# Patient Record
Sex: MALE | Race: AMERICAN INDIAN OR ALASKA NATIVE | Employment: UNEMPLOYED | URBAN - METROPOLITAN AREA
[De-identification: names, ages, dates, MRNs, and addresses within clinical notes are randomized per-mention and may not be internally consistent; named-entity substitution may affect disease eponyms.]

---

## 2021-03-17 ENCOUNTER — HOSPITAL ENCOUNTER (INPATIENT)
Age: 71
LOS: 1 days | Discharge: HOME | DRG: 726 | End: 2021-03-18
Attending: EMERGENCY MEDICINE | Admitting: INTERNAL MEDICINE
Payer: MEDICARE

## 2021-03-17 ENCOUNTER — APPOINTMENT (OUTPATIENT)
Dept: CT IMAGING | Age: 71
DRG: 726 | End: 2021-03-17
Payer: MEDICARE

## 2021-03-17 ENCOUNTER — APPOINTMENT (OUTPATIENT)
Dept: GENERAL RADIOLOGY | Age: 71
DRG: 726 | End: 2021-03-17
Payer: MEDICARE

## 2021-03-17 DIAGNOSIS — R10.9 ABDOMINAL PAIN, UNSPECIFIED ABDOMINAL LOCATION: Primary | ICD-10-CM

## 2021-03-17 DIAGNOSIS — N17.9 AKI (ACUTE KIDNEY INJURY) (HCC): ICD-10-CM

## 2021-03-17 DIAGNOSIS — R33.9 URINARY RETENTION: ICD-10-CM

## 2021-03-17 LAB
ALBUMIN SERPL-MCNC: 3.4 GM/DL (ref 3.4–5)
ALP BLD-CCNC: 61 IU/L (ref 40–129)
ALT SERPL-CCNC: 19 U/L (ref 10–40)
ANION GAP SERPL CALCULATED.3IONS-SCNC: 13 MMOL/L (ref 4–16)
AST SERPL-CCNC: 18 IU/L (ref 15–37)
BACTERIA: NEGATIVE /HPF
BANDED NEUTROPHILS ABSOLUTE COUNT: 0.17 K/CU MM
BANDED NEUTROPHILS RELATIVE PERCENT: 1 % (ref 5–11)
BILIRUB SERPL-MCNC: 1 MG/DL (ref 0–1)
BILIRUBIN URINE: NEGATIVE MG/DL
BLOOD, URINE: ABNORMAL
BUN BLDV-MCNC: 43 MG/DL (ref 6–23)
CALCIUM SERPL-MCNC: 9.6 MG/DL (ref 8.3–10.6)
CHLORIDE BLD-SCNC: 97 MMOL/L (ref 99–110)
CLARITY: CLEAR
CO2: 22 MMOL/L (ref 21–32)
COLOR: YELLOW
CREAT SERPL-MCNC: 2 MG/DL (ref 0.9–1.3)
DIFFERENTIAL TYPE: ABNORMAL
GFR AFRICAN AMERICAN: 40 ML/MIN/1.73M2
GFR NON-AFRICAN AMERICAN: 33 ML/MIN/1.73M2
GLUCOSE BLD-MCNC: 106 MG/DL (ref 70–99)
GLUCOSE BLD-MCNC: 158 MG/DL (ref 70–99)
GLUCOSE, URINE: >500 MG/DL
HCT VFR BLD CALC: 50.9 % (ref 42–52)
HEMOGLOBIN: 16.7 GM/DL (ref 13.5–18)
KETONES, URINE: NEGATIVE MG/DL
LACTATE: 1.6 MMOL/L (ref 0.4–2)
LACTATE: 2.6 MMOL/L (ref 0.4–2)
LEUKOCYTE ESTERASE, URINE: NEGATIVE
LIPASE: 15 IU/L (ref 13–60)
LYMPHOCYTES ABSOLUTE: 1.3 K/CU MM
LYMPHOCYTES RELATIVE PERCENT: 8 % (ref 24–44)
MCH RBC QN AUTO: 28.5 PG (ref 27–31)
MCHC RBC AUTO-ENTMCNC: 32.8 % (ref 32–36)
MCV RBC AUTO: 87 FL (ref 78–100)
MONOCYTES ABSOLUTE: 0.8 K/CU MM
MONOCYTES RELATIVE PERCENT: 5 % (ref 0–4)
MUCUS: ABNORMAL HPF
NITRITE URINE, QUANTITATIVE: NEGATIVE
PDW BLD-RTO: 13.6 % (ref 11.7–14.9)
PH, URINE: 5 (ref 5–8)
PLATELET # BLD: 196 K/CU MM (ref 140–440)
PMV BLD AUTO: 8.9 FL (ref 7.5–11.1)
POTASSIUM SERPL-SCNC: 4.7 MMOL/L (ref 3.5–5.1)
PRO-BNP: 1509 PG/ML
PROTEIN UA: NEGATIVE MG/DL
RBC # BLD: 5.85 M/CU MM (ref 4.6–6.2)
RBC URINE: 8 /HPF (ref 0–3)
SEGMENTED NEUTROPHILS ABSOLUTE COUNT: 14.3 K/CU MM
SEGMENTED NEUTROPHILS RELATIVE PERCENT: 86 % (ref 36–66)
SODIUM BLD-SCNC: 132 MMOL/L (ref 135–145)
SPECIFIC GRAVITY UA: 1.01 (ref 1–1.03)
TOTAL PROTEIN: 7 GM/DL (ref 6.4–8.2)
TRICHOMONAS: ABNORMAL /HPF
TROPONIN T: 0.06 NG/ML
TROPONIN T: 0.06 NG/ML
UROBILINOGEN, URINE: NEGATIVE MG/DL (ref 0.2–1)
WBC # BLD: 16.6 K/CU MM (ref 4–10.5)
WBC UA: 1 /HPF (ref 0–2)

## 2021-03-17 PROCEDURE — 2500000003 HC RX 250 WO HCPCS: Performed by: PHYSICIAN ASSISTANT

## 2021-03-17 PROCEDURE — 94761 N-INVAS EAR/PLS OXIMETRY MLT: CPT

## 2021-03-17 PROCEDURE — 2580000003 HC RX 258: Performed by: PHYSICIAN ASSISTANT

## 2021-03-17 PROCEDURE — 6360000004 HC RX CONTRAST MEDICATION: Performed by: PHYSICIAN ASSISTANT

## 2021-03-17 PROCEDURE — 99285 EMERGENCY DEPT VISIT HI MDM: CPT

## 2021-03-17 PROCEDURE — 36415 COLL VENOUS BLD VENIPUNCTURE: CPT

## 2021-03-17 PROCEDURE — 83880 ASSAY OF NATRIURETIC PEPTIDE: CPT

## 2021-03-17 PROCEDURE — 85007 BL SMEAR W/DIFF WBC COUNT: CPT

## 2021-03-17 PROCEDURE — 96361 HYDRATE IV INFUSION ADD-ON: CPT

## 2021-03-17 PROCEDURE — 87040 BLOOD CULTURE FOR BACTERIA: CPT

## 2021-03-17 PROCEDURE — 85027 COMPLETE CBC AUTOMATED: CPT

## 2021-03-17 PROCEDURE — 83605 ASSAY OF LACTIC ACID: CPT

## 2021-03-17 PROCEDURE — 82962 GLUCOSE BLOOD TEST: CPT

## 2021-03-17 PROCEDURE — 74174 CTA ABD&PLVS W/CONTRAST: CPT

## 2021-03-17 PROCEDURE — 80053 COMPREHEN METABOLIC PANEL: CPT

## 2021-03-17 PROCEDURE — 51702 INSERT TEMP BLADDER CATH: CPT

## 2021-03-17 PROCEDURE — 1200000000 HC SEMI PRIVATE

## 2021-03-17 PROCEDURE — 93005 ELECTROCARDIOGRAM TRACING: CPT | Performed by: PHYSICIAN ASSISTANT

## 2021-03-17 PROCEDURE — 81001 URINALYSIS AUTO W/SCOPE: CPT

## 2021-03-17 PROCEDURE — 2580000003 HC RX 258: Performed by: INTERNAL MEDICINE

## 2021-03-17 PROCEDURE — 84484 ASSAY OF TROPONIN QUANT: CPT

## 2021-03-17 PROCEDURE — 96365 THER/PROPH/DIAG IV INF INIT: CPT

## 2021-03-17 PROCEDURE — 83690 ASSAY OF LIPASE: CPT

## 2021-03-17 PROCEDURE — 6360000002 HC RX W HCPCS: Performed by: PHYSICIAN ASSISTANT

## 2021-03-17 RX ORDER — SODIUM CHLORIDE 0.9 % (FLUSH) 0.9 %
10 SYRINGE (ML) INJECTION 2 TIMES DAILY
Status: DISCONTINUED | OUTPATIENT
Start: 2021-03-17 | End: 2021-03-17

## 2021-03-17 RX ORDER — POLYETHYLENE GLYCOL 3350 17 G/17G
17 POWDER, FOR SOLUTION ORAL DAILY PRN
Status: DISCONTINUED | OUTPATIENT
Start: 2021-03-17 | End: 2021-03-18 | Stop reason: HOSPADM

## 2021-03-17 RX ORDER — TAMSULOSIN HYDROCHLORIDE 0.4 MG/1
0.4 CAPSULE ORAL DAILY
Status: DISCONTINUED | OUTPATIENT
Start: 2021-03-18 | End: 2021-03-18 | Stop reason: HOSPADM

## 2021-03-17 RX ORDER — HEPARIN SODIUM 5000 [USP'U]/ML
5000 INJECTION, SOLUTION INTRAVENOUS; SUBCUTANEOUS EVERY 8 HOURS SCHEDULED
Status: DISCONTINUED | OUTPATIENT
Start: 2021-03-18 | End: 2021-03-18 | Stop reason: HOSPADM

## 2021-03-17 RX ORDER — SODIUM CHLORIDE 0.9 % (FLUSH) 0.9 %
10 SYRINGE (ML) INJECTION EVERY 12 HOURS SCHEDULED
Status: DISCONTINUED | OUTPATIENT
Start: 2021-03-17 | End: 2021-03-18 | Stop reason: HOSPADM

## 2021-03-17 RX ORDER — DEXTROSE MONOHYDRATE 50 MG/ML
100 INJECTION, SOLUTION INTRAVENOUS PRN
Status: DISCONTINUED | OUTPATIENT
Start: 2021-03-17 | End: 2021-03-18 | Stop reason: HOSPADM

## 2021-03-17 RX ORDER — NICOTINE POLACRILEX 4 MG
15 LOZENGE BUCCAL PRN
Status: DISCONTINUED | OUTPATIENT
Start: 2021-03-17 | End: 2021-03-18 | Stop reason: HOSPADM

## 2021-03-17 RX ORDER — DEXTROSE MONOHYDRATE 25 G/50ML
12.5 INJECTION, SOLUTION INTRAVENOUS PRN
Status: DISCONTINUED | OUTPATIENT
Start: 2021-03-17 | End: 2021-03-18 | Stop reason: HOSPADM

## 2021-03-17 RX ORDER — ACETAMINOPHEN 325 MG/1
650 TABLET ORAL EVERY 6 HOURS PRN
Status: DISCONTINUED | OUTPATIENT
Start: 2021-03-17 | End: 2021-03-18 | Stop reason: HOSPADM

## 2021-03-17 RX ORDER — ONDANSETRON 2 MG/ML
4 INJECTION INTRAMUSCULAR; INTRAVENOUS EVERY 6 HOURS PRN
Status: DISCONTINUED | OUTPATIENT
Start: 2021-03-17 | End: 2021-03-18 | Stop reason: HOSPADM

## 2021-03-17 RX ORDER — SODIUM CHLORIDE 0.9 % (FLUSH) 0.9 %
10 SYRINGE (ML) INJECTION PRN
Status: DISCONTINUED | OUTPATIENT
Start: 2021-03-17 | End: 2021-03-18 | Stop reason: HOSPADM

## 2021-03-17 RX ORDER — 0.9 % SODIUM CHLORIDE 0.9 %
1000 INTRAVENOUS SOLUTION INTRAVENOUS ONCE
Status: COMPLETED | OUTPATIENT
Start: 2021-03-17 | End: 2021-03-17

## 2021-03-17 RX ORDER — PROMETHAZINE HYDROCHLORIDE 12.5 MG/1
12.5 TABLET ORAL EVERY 6 HOURS PRN
Status: DISCONTINUED | OUTPATIENT
Start: 2021-03-17 | End: 2021-03-18 | Stop reason: HOSPADM

## 2021-03-17 RX ORDER — SODIUM CHLORIDE 9 MG/ML
INJECTION, SOLUTION INTRAVENOUS CONTINUOUS
Status: DISCONTINUED | OUTPATIENT
Start: 2021-03-17 | End: 2021-03-18 | Stop reason: HOSPADM

## 2021-03-17 RX ORDER — ACETAMINOPHEN 650 MG/1
650 SUPPOSITORY RECTAL EVERY 6 HOURS PRN
Status: DISCONTINUED | OUTPATIENT
Start: 2021-03-17 | End: 2021-03-18 | Stop reason: HOSPADM

## 2021-03-17 RX ORDER — SODIUM CHLORIDE 9 MG/ML
INJECTION, SOLUTION INTRAVENOUS CONTINUOUS
Status: DISCONTINUED | OUTPATIENT
Start: 2021-03-17 | End: 2021-03-17

## 2021-03-17 RX ADMIN — SODIUM CHLORIDE: 9 INJECTION, SOLUTION INTRAVENOUS at 17:40

## 2021-03-17 RX ADMIN — CEFTRIAXONE 1000 MG: 1 INJECTION, POWDER, FOR SOLUTION INTRAMUSCULAR; INTRAVENOUS at 20:37

## 2021-03-17 RX ADMIN — METRONIDAZOLE 500 MG: 500 INJECTION, SOLUTION INTRAVENOUS at 22:16

## 2021-03-17 RX ADMIN — SODIUM CHLORIDE 1000 ML: 9 INJECTION, SOLUTION INTRAVENOUS at 17:40

## 2021-03-17 RX ADMIN — IOPAMIDOL 100 ML: 755 INJECTION, SOLUTION INTRAVENOUS at 19:19

## 2021-03-17 RX ADMIN — SODIUM CHLORIDE: 9 INJECTION, SOLUTION INTRAVENOUS at 23:10

## 2021-03-17 SDOH — HEALTH STABILITY: MENTAL HEALTH: HOW OFTEN DO YOU HAVE A DRINK CONTAINING ALCOHOL?: NEVER

## 2021-03-17 ASSESSMENT — PAIN SCALES - GENERAL
PAINLEVEL_OUTOF10: 0
PAINLEVEL_OUTOF10: 0
PAINLEVEL_OUTOF10: 6

## 2021-03-17 ASSESSMENT — PAIN DESCRIPTION - LOCATION: LOCATION: ABDOMEN

## 2021-03-17 NOTE — ED PROVIDER NOTES
Non-medical: Not on file   Tobacco Use    Smoking status: Never Smoker    Smokeless tobacco: Never Used   Substance and Sexual Activity    Alcohol use: Never     Frequency: Never    Drug use: Not on file    Sexual activity: Not on file   Lifestyle    Physical activity     Days per week: Not on file     Minutes per session: Not on file    Stress: Not on file   Relationships    Social connections     Talks on phone: Not on file     Gets together: Not on file     Attends [employer] service: Not on file     Active member of club or organization: Not on file     Attends meetings of clubs or organizations: Not on file     Relationship status: Not on file    Intimate partner violence     Fear of current or ex partner: Not on file     Emotionally abused: Not on file     Physically abused: Not on file     Forced sexual activity: Not on file   Other Topics Concern    Not on file   Social History Narrative    Not on file     No family history on file. PHYSICAL EXAM    VITAL SIGNS: BP (!) 148/81   Pulse 101   Temp 99.2 °F (37.3 °C) (Oral)   Resp 18   Ht 5' 6\" (1.676 m)   Wt 154 lb 5.2 oz (70 kg)   SpO2 95%   BMI 24.91 kg/m²    Constitutional:  Well developed, Well nourished. No distress  HENT:  Normocephalic, Atraumatic, PERRL. EOMI. Sclera clear. Conjunctiva normal, No discharge. Neck/Lymphatics: supple, no JVD, no swollen nodes  Cardiovascular: Rate 115, regular no murmurs/rubs/gallops. No JVD  No carotid bruits or murmurs heard in carotids. Respiratory:  Nonlabored breathing. Normal breath sounds, No wheezing  Abdomen: Hypoactive bowel sounds. Patient's abdomen is distended. There is moderate abdominal tenderness and guarding in all 4 quadrants with rebound, most notable in the lower quadrants. There is tympany to percussion in the upper abdominal quadrants. Musculoskeletal:    There is no edema, asymmetry, or calf / thigh tenderness bilaterally. No cyanosis.     No cool or pale-appearing limb. Distal cap refill and pulses intact bilateral upper and lower extremities  Bilateral upper and lower extremity ROM intact without pain or obvious deficit  Integument:  Warm, Dry  Neurologic: Alert & oriented , No focal deficits noted. Cranial nerves II through XII grossly intact. Normal gross motor coordination & motor strength bilateral upper and lower extremities  Sensation intact.   Psychiatric:  Affect normal, Mood normal.       Labs:  Results for orders placed or performed during the hospital encounter of 03/17/21   CBC Auto Differential   Result Value Ref Range    WBC 16.6 (H) 4.0 - 10.5 K/CU MM    RBC 5.85 4.6 - 6.2 M/CU MM    Hemoglobin 16.7 13.5 - 18.0 GM/DL    Hematocrit 50.9 42 - 52 %    MCV 87.0 78 - 100 FL    MCH 28.5 27 - 31 PG    MCHC 32.8 32.0 - 36.0 %    RDW 13.6 11.7 - 14.9 %    Platelets 902 924 - 271 K/CU MM    MPV 8.9 7.5 - 11.1 FL    Bands Relative 1 (L) 5 - 11 %    Segs Relative 86.0 (H) 36 - 66 %    Lymphocytes % 8.0 (L) 24 - 44 %    Monocytes % 5.0 (H) 0 - 4 %    Bands Absolute 0.17 K/CU MM    Segs Absolute 14.3 K/CU MM    Lymphocytes Absolute 1.3 K/CU MM    Monocytes Absolute 0.8 K/CU MM    Differential Type MANUAL DIFFERENTIAL    Comprehensive Metabolic Panel   Result Value Ref Range    Sodium 132 (L) 135 - 145 MMOL/L    Potassium 4.7 3.5 - 5.1 MMOL/L    Chloride 97 (L) 99 - 110 mMol/L    CO2 22 21 - 32 MMOL/L    BUN 43 (H) 6 - 23 MG/DL    CREATININE 2.0 (H) 0.9 - 1.3 MG/DL    Glucose 158 (H) 70 - 99 MG/DL    Calcium 9.6 8.3 - 10.6 MG/DL    Albumin 3.4 3.4 - 5.0 GM/DL    Total Protein 7.0 6.4 - 8.2 GM/DL    Total Bilirubin 1.0 0.0 - 1.0 MG/DL    ALT 19 10 - 40 U/L    AST 18 15 - 37 IU/L    Alkaline Phosphatase 61 40 - 129 IU/L    GFR Non- 33 (L) >60 mL/min/1.73m2    GFR  40 (L) >60 mL/min/1.73m2    Anion Gap 13 4 - 16   Urinalysis   Result Value Ref Range    Color, UA YELLOW YELLOW    Clarity, UA CLEAR CLEAR    Glucose, Urine >500 (A) NEGATIVE MG/DL    Bilirubin Urine NEGATIVE NEGATIVE MG/DL    Ketones, Urine NEGATIVE NEGATIVE MG/DL    Specific Gravity, UA 1.011 1.001 - 1.035    Blood, Urine LARGE (A) NEGATIVE    pH, Urine 5.0 5.0 - 8.0    Protein, UA NEGATIVE NEGATIVE MG/DL    Urobilinogen, Urine NEGATIVE 0.2 - 1.0 MG/DL    Nitrite Urine, Quantitative NEGATIVE NEGATIVE    Leukocyte Esterase, Urine NEGATIVE NEGATIVE    RBC, UA 8 (H) 0 - 3 /HPF    WBC, UA 1 0 - 2 /HPF    Bacteria, UA NEGATIVE NEGATIVE /HPF    Mucus, UA RARE (A) NEGATIVE HPF    Trichomonas, UA NONE SEEN NONE SEEN /HPF   Lactic Acid, Plasma   Result Value Ref Range    Lactate 2.6 (HH) 0.4 - 2.0 mMOL/L   Troponin   Result Value Ref Range    Troponin T 0.064 (H) <0.01 NG/ML   Lactic Acid, Plasma   Result Value Ref Range    Lactate 1.6 0.4 - 2.0 mMOL/L   EKG 12 Lead   Result Value Ref Range    Ventricular Rate 117 BPM    Atrial Rate 117 BPM    P-R Interval 142 ms    QRS Duration 74 ms    Q-T Interval 324 ms    QTc Calculation (Bazett) 451 ms    P Axis 42 degrees    R Axis -42 degrees    T Axis 33 degrees    Diagnosis       Sinus tachycardia  Left axis deviation  Abnormal ECG  No previous ECGs available             EKG Interpretation  Please see ED physician's note for EKG interpretation       RADIOLOGY    CTA ABDOMEN PELVIS W CONTRAST   Final Result   Nonocclusive plaque in the proximal aspect of the superior mesenteric artery   leads to moderate to high-grade stenosis. Correlate with any clinical   evidence of chronic mesenteric ischemia. Bilateral perinephric fat stranding and periureteral fat stranding. Correlate with clinical evidence of urinary tract infection. Moderate to marked urinary bladder distention, likely on the basis of bladder   outlet obstruction. ED COURSE & MEDICAL DECISION MAKING       Patient presents as above with abdominal discomfort. Bingham catheter placed in 1200 mL of urine was evacuated. Patient's pain improved.   CTA abdomen/pelvis was done today and reveals evidence of mesenteric artery stenosis without occlusion. I reviewed these findings with on-call thoracic surgeon, Dr. Josee Hernandez. She advises that this is not acute or emergent findings and that no emergent intervention warranted regarding abdominal vasculature. Patient does have evidence of NOE, likely from urinary retention. He does have elevated troponin which I suspect is from NOE. No acute EKG changes. 2110-I discussed patient case with hospitalist, Dr. Dafne Perez. She agrees to admit patient. Clinical  IMPRESSION    1. Abdominal pain, unspecified abdominal location    2. Urinary retention    3. NOE (acute kidney injury) ([facility] [address]        Pt admitted. Comment: Please note this report has been produced using speech recognition software and may contain errors related to that system including errors in grammar, punctuation, and spelling, as well as words and phrases that may be inappropriate. If there are any questions or concerns please feel free to contact the dictating provider for clarification.          Flavia Adams  03/17/21 2114

## 2021-03-17 NOTE — ED NOTES
Bed: ED-27  Expected date:   Expected time:   Means of arrival:   Comments:  Cleaning now     Chase Villeda RN  03/17/21 5639

## 2021-03-17 NOTE — ED PROVIDER NOTES
I independently examined and evaluated Chester Mistry. In brief their history revealed a 69 y.o. male who presents with constipation, generalized abdominal discomfort. Onset 3-4 days. Context is patient states has not had a bowel movement for 3 days notes generalized abdominal discomfort with one episode of nonbloody vomiting 2 days ago. Patient's daughter who is present today says patient has not eaten for the past 24 hours. Patient has had some dysuria and difficulty urinating. Denies history of prostate issues were abdominal surgeries no other questions or concerns    Their focused exam revealed alert and oriented male resting in bed no distress or cephalic atraumatic sclera clear lungs clear heart tachycardic 2+ pulses throughout regular rhythm abdomen is soft distended mildly tender diffusely Bingham catheter in place with drainage, urine no hernia no pulsatile mass bowel sounds present normal.  5 of 5 strength throughout skin has no other rashes swelling cranial nerves intact. ED course: Patient seen with PA please see his note. Patient here with abdominal pain, distention, nausea vomiting, difficulty urinating. Denies any medical problems or history of abdominal surgeries or history of prostate issues. States he has been having difficulty urinating, dysuria. On arrival he was tachycardic distended guarding I did get involved in his case. Patient went to CT scan which shows possible mesenteric ischemia will consult cardiothoracic surgery patient did have distended bladder feels better after Bingham catheter went urinalysis. Will give antibiotics IV fluids pain nausea medicine. He does have elevated white count. Could be stress reaction versus infection. Patient otherwise stable admit. All diagnostic, treatment, and disposition decisions were made by myself in conjunction with the Advanced Practice Provider.     For all further details of the patient's emergency department visit, please see the Advanced Practice Provider's documentation. 12 lead EKG per my interpretation:  Sinus Tachycardia 117  Axis is   Left axis deviation  QTc is  451  There is no specific T wave changes appreciated. There is no specific ST wave changes appreciated.     Prior EKG to compare with was not available         Norma Domingo, DO  03/17/21 5400 Abe Alonso,   03/17/21 2024

## 2021-03-18 VITALS
WEIGHT: 154.32 LBS | TEMPERATURE: 97.9 F | RESPIRATION RATE: 17 BRPM | BODY MASS INDEX: 24.8 KG/M2 | OXYGEN SATURATION: 94 % | SYSTOLIC BLOOD PRESSURE: 125 MMHG | DIASTOLIC BLOOD PRESSURE: 73 MMHG | HEART RATE: 78 BPM | HEIGHT: 66 IN

## 2021-03-18 LAB
ANION GAP SERPL CALCULATED.3IONS-SCNC: 9 MMOL/L (ref 4–16)
BASOPHILS ABSOLUTE: 0 K/CU MM
BASOPHILS RELATIVE PERCENT: 0.4 % (ref 0–1)
BUN BLDV-MCNC: 27 MG/DL (ref 6–23)
CALCIUM SERPL-MCNC: 8.6 MG/DL (ref 8.3–10.6)
CHLORIDE BLD-SCNC: 105 MMOL/L (ref 99–110)
CO2: 25 MMOL/L (ref 21–32)
CREAT SERPL-MCNC: 1.2 MG/DL (ref 0.9–1.3)
DIFFERENTIAL TYPE: ABNORMAL
EOSINOPHILS ABSOLUTE: 0.3 K/CU MM
EOSINOPHILS RELATIVE PERCENT: 2.9 % (ref 0–3)
GFR AFRICAN AMERICAN: >60 ML/MIN/1.73M2
GFR NON-AFRICAN AMERICAN: 60 ML/MIN/1.73M2
GLUCOSE BLD-MCNC: 106 MG/DL (ref 70–99)
GLUCOSE BLD-MCNC: 99 MG/DL (ref 70–99)
HCT VFR BLD CALC: 43 % (ref 42–52)
HEMOGLOBIN: 14.4 GM/DL (ref 13.5–18)
IMMATURE NEUTROPHIL %: 0.5 % (ref 0–0.43)
LYMPHOCYTES ABSOLUTE: 1.3 K/CU MM
LYMPHOCYTES RELATIVE PERCENT: 12.8 % (ref 24–44)
MAGNESIUM: 2.1 MG/DL (ref 1.8–2.4)
MCH RBC QN AUTO: 28.7 PG (ref 27–31)
MCHC RBC AUTO-ENTMCNC: 33.5 % (ref 32–36)
MCV RBC AUTO: 85.7 FL (ref 78–100)
MONOCYTES ABSOLUTE: 1.4 K/CU MM
MONOCYTES RELATIVE PERCENT: 13.9 % (ref 0–4)
NUCLEATED RBC %: 0 %
PDW BLD-RTO: 13.6 % (ref 11.7–14.9)
PLATELET # BLD: 169 K/CU MM (ref 140–440)
PMV BLD AUTO: 9.1 FL (ref 7.5–11.1)
POTASSIUM SERPL-SCNC: 3.5 MMOL/L (ref 3.5–5.1)
RBC # BLD: 5.02 M/CU MM (ref 4.6–6.2)
SEGMENTED NEUTROPHILS ABSOLUTE COUNT: 7 K/CU MM
SEGMENTED NEUTROPHILS RELATIVE PERCENT: 69.5 % (ref 36–66)
SODIUM BLD-SCNC: 139 MMOL/L (ref 135–145)
TOTAL IMMATURE NEUTOROPHIL: 0.05 K/CU MM
TOTAL NUCLEATED RBC: 0 K/CU MM
TROPONIN T: 0.05 NG/ML
WBC # BLD: 10.1 K/CU MM (ref 4–10.5)

## 2021-03-18 PROCEDURE — 83735 ASSAY OF MAGNESIUM: CPT

## 2021-03-18 PROCEDURE — 84484 ASSAY OF TROPONIN QUANT: CPT

## 2021-03-18 PROCEDURE — 93010 ELECTROCARDIOGRAM REPORT: CPT | Performed by: INTERNAL MEDICINE

## 2021-03-18 PROCEDURE — 2580000003 HC RX 258: Performed by: INTERNAL MEDICINE

## 2021-03-18 PROCEDURE — 6360000002 HC RX W HCPCS: Performed by: INTERNAL MEDICINE

## 2021-03-18 PROCEDURE — 6370000000 HC RX 637 (ALT 250 FOR IP): Performed by: INTERNAL MEDICINE

## 2021-03-18 PROCEDURE — 85025 COMPLETE CBC W/AUTO DIFF WBC: CPT

## 2021-03-18 PROCEDURE — 6370000000 HC RX 637 (ALT 250 FOR IP): Performed by: SPECIALIST

## 2021-03-18 PROCEDURE — 80048 BASIC METABOLIC PNL TOTAL CA: CPT

## 2021-03-18 PROCEDURE — 94761 N-INVAS EAR/PLS OXIMETRY MLT: CPT

## 2021-03-18 PROCEDURE — C9113 INJ PANTOPRAZOLE SODIUM, VIA: HCPCS | Performed by: INTERNAL MEDICINE

## 2021-03-18 PROCEDURE — 36415 COLL VENOUS BLD VENIPUNCTURE: CPT

## 2021-03-18 PROCEDURE — 82962 GLUCOSE BLOOD TEST: CPT

## 2021-03-18 RX ORDER — ASPIRIN 81 MG/1
81 TABLET ORAL DAILY
Qty: 90 TABLET | Refills: 1 | Status: SHIPPED | OUTPATIENT
Start: 2021-03-18

## 2021-03-18 RX ORDER — TAMSULOSIN HYDROCHLORIDE 0.4 MG/1
0.4 CAPSULE ORAL DAILY
Qty: 30 CAPSULE | Refills: 3 | Status: SHIPPED | OUTPATIENT
Start: 2021-03-19

## 2021-03-18 RX ORDER — POLYETHYLENE GLYCOL 3350 17 G/17G
17 POWDER, FOR SOLUTION ORAL DAILY
Status: DISCONTINUED | OUTPATIENT
Start: 2021-03-18 | End: 2021-03-18 | Stop reason: HOSPADM

## 2021-03-18 RX ORDER — PANTOPRAZOLE SODIUM 40 MG/1
40 TABLET, DELAYED RELEASE ORAL
Qty: 30 TABLET | Refills: 3 | Status: SHIPPED | OUTPATIENT
Start: 2021-03-18

## 2021-03-18 RX ORDER — POLYETHYLENE GLYCOL 3350 17 G/17G
17 POWDER, FOR SOLUTION ORAL DAILY PRN
Qty: 30 EACH | Refills: 0 | Status: SHIPPED | OUTPATIENT
Start: 2021-03-18 | End: 2021-04-17

## 2021-03-18 RX ORDER — PANTOPRAZOLE SODIUM 40 MG/10ML
40 INJECTION, POWDER, LYOPHILIZED, FOR SOLUTION INTRAVENOUS DAILY
Status: DISCONTINUED | OUTPATIENT
Start: 2021-03-18 | End: 2021-03-18 | Stop reason: HOSPADM

## 2021-03-18 RX ADMIN — HEPARIN SODIUM 5000 UNITS: 5000 INJECTION, SOLUTION INTRAVENOUS; SUBCUTANEOUS at 06:10

## 2021-03-18 RX ADMIN — POLYETHYLENE GLYCOL (3350) 17 G: 17 POWDER, FOR SOLUTION ORAL at 09:40

## 2021-03-18 RX ADMIN — METOPROLOL TARTRATE 25 MG: 25 TABLET, FILM COATED ORAL at 09:40

## 2021-03-18 RX ADMIN — SODIUM CHLORIDE, PRESERVATIVE FREE 10 ML: 5 INJECTION INTRAVENOUS at 08:54

## 2021-03-18 RX ADMIN — PANTOPRAZOLE SODIUM 40 MG: 40 INJECTION, POWDER, FOR SOLUTION INTRAVENOUS at 08:54

## 2021-03-18 RX ADMIN — TAMSULOSIN HYDROCHLORIDE 0.4 MG: 0.4 CAPSULE ORAL at 09:40

## 2021-03-18 RX ADMIN — SODIUM CHLORIDE: 9 INJECTION, SOLUTION INTRAVENOUS at 09:40

## 2021-03-18 ASSESSMENT — PAIN SCALES - GENERAL: PAINLEVEL_OUTOF10: 0

## 2021-03-18 NOTE — CONSULTS
[address], [address] [facility]                                  CONSULTATION    PATIENT NAME: Yulissa Kaplan                   :        1950  MED REC NO:   [id number]                          ROOM:       [id number]  ACCOUNT NO:   [de-identified]                           ADMIT DATE: 2021  PROVIDER:     John De La Cruz MD    CONSULT DATE:  2021    CHIEF COMPLAINT:  Abdominal pain with constipation and urinary  retention. HISTORY OF PRESENT ILLNESS:  The patient is a 65-year-old [place] [facility] male  with past medical history significant for diabetes mellitus who  presented to the emergency room on 2021 with constipation for the  past three days, generalized abdominal discomfort and urinary retention  with dribbling of urine and the patient also gives history of  intermittent hematuria. The patient had vomiting x1, but there is no  history of melena, hematochezia, anorexia or weight loss. In the ER,  the patient had a blood workup done, which comprised of chem profile,  which was remarkable for BUN of 43, creatinine 2 and LFTs were within  normal limits. CBC showed a WBC count of 16.6, hemoglobin 16.7,  platelet count of 536,519. CAT scan of the abdomen and pelvis was done,  which showed nonobstructive plaque in the proximal aspect of the SMA,  also bilateral perinephric fat stranding was noted along with  periureteral fat stranding and moderate-to-marked urinary distention due  to bladder outlet obstruction as well. The patient was admitted for  further workup and management. The patient has never had an EGD or  colonoscopy done and patient denies problem with his bowels in the past  as well. REVIEW OF SYSTEMS:  CENTRAL NERVOUS SYSTEM:  The patient denies headache or focal  sensorimotor symptoms. CARDIOVASCULAR SYSTEM:  No history of chest pain, shortness of breath,  or leg swelling.   GENITOURINARY SYSTEM:  The patient complains of urinary retention with  difficulty in micturition and dribbling of the urine for the past three  to four days with progressive abdominal distention. MUSCULOSKELETAL SYSTEM:  The patient complains of generalized weakness. RESPIRATORY SYSTEM:  No history of cough, hemoptysis, fever, or chills. PAST MEDICAL HISTORY:  Significant for history of diabetes mellitus. FAMILY HISTORY:  Noncontributory. MEDICATIONS:  Please refer to chart. SOCIOECONOMIC HISTORY:  No history of EtOH abuse and the patient does  not smoke cigarettes. PAST SURGICAL HISTORY:  None. ALLERGIES:  No known drug allergies. PHYSICAL EXAMINATION:  GENERAL:  Shows a 67-year-old [place] ([facility]) male of average build and  nutritional status, who is lying comfortably flat in bed, in no acute  distress. He is awake, alert, and oriented and pleasant to talk with. VITAL SIGNS:  Stable. HEENT:  Shows skull to be atraumatic. NECK:  Supple. CHEST:  Clear. HEART:  S1 and S2 are normal.  ABDOMEN:  Soft and distended with mild generalized tenderness with no  guarding or rigidity. Liver and spleen are not palpable. RECTAL:  Deferred. CNS:  Shows the patient to be awake, alert, and oriented. There are no  focal sensorimotor signs. MUSCULOSKELETAL SYSTEM:  Shows evidence of degenerative joint disease  changes. LABORATORY DATA:  As above mentioned. IMPRESSION:  A 67-year-old [place] ([facility]) male in apparently good health apart  from history of diabetes mellitus, presents with generalized abdominal  discomfort, distention, constipation and urinary retention, and etiology  to be determined. RECOMMENDATIONS:  1. Agree with present management. 2.  The patient will need a Urology evaluation for urinary retention. 3.  We will start the patient on MiraLAX 17 gm p.o. at bedtime. 4.  In fact once the patient's urinary retention is resolved, the  patient will need a colonoscopy also as a part of workup of his  constipation.   5. Cardiothoracic Surgery consult in order also for nonocclusive plaque  noted in the SMA. 6.  The case and plan have been discussed in detail with the patient and  his daughter-in-law.         Nuris Edmond MD    D: 03/18/2021 8:37:33       T: 03/18/2021 12:16:37     [email or web address]  Job#: [id number]     Doc#: [id number]    CC:

## 2021-03-18 NOTE — ED NOTES
CTA ABDOMEN PELVIS W CONTRAST  Status: Final result   Order Providers    Authorizing Billing   Flavia Grey MD          Signed by    Signed Date/Time Phone Pager   Asim Reed 3/17/2021  7:59 [phone]    Reading Providers    Read Date Phone Pager   Jelani Fisher Mar 17, 2021  7:59 [phone]    Radiation Dose Estimates    No radiation information found for this patient   Narrative   EXAMINATION:   CTA OF THE ABDOMEN AND PELVIS WITH CONTRAST 3/17/2021 4:04 pm       TECHNIQUE:   CTA of the abdomen and pelvis was performed with the administration of   intravenous contrast. Multiplanar reformatted images are provided for review. MIP images are provided for review. Dose modulation, iterative   reconstruction, and/or weight based adjustment of the mA/kV was utilized to   reduce the radiation dose to as low as reasonably achievable.       COMPARISON:   None.       HISTORY:   ORDERING SYSTEM PROVIDED HISTORY: abd pain, constipation, vomiting   TECHNOLOGIST PROVIDED HISTORY:   Reason for exam:->abd pain, constipation, vomiting   Decision Support Exception->Emergency Medical Condition (MA)   Reason for Exam: midline upper abd pain       FINDINGS:   VASCULAR STRUCTURES:       The abdominal aorta is normal in caliber without evidence of dissection.       The celiac artery and its branches are widely patent.  There is moderate to   high-grade stenosis of the proximal superior mesenteric artery secondary to   fibro atheromatous plaque.  The degree of narrowing is approximately 60-70%.    The distal aspect of the SMA does enhance.  The inferior mesenteric artery is   diminutive but patent.       The renal arteries are patent bilaterally.  The iliac systems are widely   patent bilaterally.       NONVASCULAR STRUCTURES:       Lower Chest: There is dependent atelectasis noted within the lungs   bilaterally.  Base of the heart is unremarkable.  Visualized extra thoracic   soft tissues are unremarkable.       Organs: There is diffuse hepatic steatosis.  No hypervascular abnormalities   are seen within the liver.  The gallbladder is unremarkable.  Normal arterial   phase imaging of the spleen and adrenals.  Pancreas is unremarkable.       There is bilateral perinephric fat stranding.  Mild periureteral fat   stranding is noted as well.  No hydronephrosis is found.       GI/Bowel: No large bowel abnormalities are identified.  The appendix is   normal.       There is mild mural thickening of the distal esophagus, raising the   possibility of esophagitis.  Otherwise, the distal esophagus, stomach,   duodenal sweep, and the remainder of the small bowel are unremarkable in   appearance.       Pelvis: There is moderate to marked urinary bladder distention, with urinary   bladder measuring 15 cm in cranial caudal dimension.  There is marked   prostatic hypertrophy.  No free pelvic fluid is found.       Peritoneum/Retroperitoneum: No retroperitoneal lymphadenopathy.       Bones/Soft Tissues: No osteolytic or osteoblastic bone lesions are   identified.  No acute bony abnormalities are detected.           Impression   Nonocclusive plaque in the proximal aspect of the superior mesenteric artery   leads to moderate to high-grade stenosis.  Correlate with any clinical   evidence of chronic mesenteric ischemia.       Bilateral perinephric fat stranding and periureteral fat stranding.    Correlate with clinical evidence of urinary tract infection.       Moderate to marked urinary bladder distention, likely on the basis of bladder   outlet obstruction.              Melvin Carvalho RN  03/17/21 2006

## 2021-03-18 NOTE — ED NOTES
Pt states abdominal pain is better after johnston insertion and bladder drainage     Roxana Dior, RN  03/17/21 2018

## 2021-03-18 NOTE — ED NOTES
Pt may have 1 family member stay with him while admitted due to language barrier per Supervisor Shahnaz Matias.       Mansi Medrano, RN  03/17/21 7629

## 2021-03-18 NOTE — ED NOTES
CTA ABDOMEN PELVIS W CONTRAST  Status: Final result   Order Providers    Authorizing Billing   Flavia Thompson MD          Signed by    Signed Date/Time Phone Pager   Rusty Vanita 3/17/2021  7:59 [phone]    Reading Providers    Read Date Phone Pager   Jade Nguyen Mar 17, 2021  7:59 [phone]    Routing History    Priority Sent On From To Message Type    3/17/2021  8:57 PM Cm Incoming Lab Results From [employer] ([employer]app[employer]) Johnny Bob DO CC'd Results   Radiation Dose Estimates    No radiation information found for this patient   Narrative   EXAMINATION:   CTA OF THE ABDOMEN AND PELVIS WITH CONTRAST 3/17/2021 4:04 pm       TECHNIQUE:   CTA of the abdomen and pelvis was performed with the administration of   intravenous contrast. Multiplanar reformatted images are provided for review. MIP images are provided for review. Dose modulation, iterative   reconstruction, and/or weight based adjustment of the mA/kV was utilized to   reduce the radiation dose to as low as reasonably achievable.       COMPARISON:   None.       HISTORY:   ORDERING SYSTEM PROVIDED HISTORY: abd pain, constipation, vomiting   TECHNOLOGIST PROVIDED HISTORY:   Reason for exam:->abd pain, constipation, vomiting   Decision Support Exception->Emergency Medical Condition (MA)   Reason for Exam: midline upper abd pain       FINDINGS:   VASCULAR STRUCTURES:       The abdominal aorta is normal in caliber without evidence of dissection.       The celiac artery and its branches are widely patent.  There is moderate to   high-grade stenosis of the proximal superior mesenteric artery secondary to   fibro atheromatous plaque.  The degree of narrowing is approximately 60-70%.    The distal aspect of the SMA does enhance.  The inferior mesenteric artery is   diminutive but patent.       The renal arteries are patent bilaterally.  The iliac systems are widely   patent bilaterally.       NONVASCULAR STRUCTURES:       Lower Chest: There is dependent atelectasis noted within the lungs   bilaterally.  Base of the heart is unremarkable.  Visualized extra thoracic   soft tissues are unremarkable.       Organs: There is diffuse hepatic steatosis.  No hypervascular abnormalities   are seen within the liver.  The gallbladder is unremarkable.  Normal arterial   phase imaging of the spleen and adrenals.  Pancreas is unremarkable.       There is bilateral perinephric fat stranding.  Mild periureteral fat   stranding is noted as well.  No hydronephrosis is found.       GI/Bowel: No large bowel abnormalities are identified.  The appendix is   normal.       There is mild mural thickening of the distal esophagus, raising the   possibility of esophagitis.  Otherwise, the distal esophagus, stomach,   duodenal sweep, and the remainder of the small bowel are unremarkable in   appearance.       Pelvis: There is moderate to marked urinary bladder distention, with urinary   bladder measuring 15 cm in cranial caudal dimension.  There is marked   prostatic hypertrophy.  No free pelvic fluid is found.       Peritoneum/Retroperitoneum: No retroperitoneal lymphadenopathy.       Bones/Soft Tissues: No osteolytic or osteoblastic bone lesions are   identified.  No acute bony abnormalities are detected.           Impression   Nonocclusive plaque in the proximal aspect of the superior mesenteric artery   leads to moderate to high-grade stenosis.  Correlate with any clinical   evidence of chronic mesenteric ischemia.       Bilateral perinephric fat stranding and periureteral fat stranding.    Correlate with clinical evidence of urinary tract infection.       Moderate to marked urinary bladder distention, likely on the basis of bladder   outlet obstruction.              Giselle Blair RN  03/17/21 8694

## 2021-03-18 NOTE — DISCHARGE SUMMARY
Paz Cerda 1950 [id number]  PCP:  Anastasia WILLS    Admit date: 3/17/2021  Admitting Physician: John Ramirez MD    Discharge date: 3/18/2021 Discharge Physician: Travis Cao MD         Discharge Diagnoses. As per below    Hospital Course:  History of present illness at admission: As per H&P  Subsequent Hospital Course:     -Acute urinary retention possibly from BPH. Passed voiding trial.  Discharge home on Flomax. Recommend outpatient urology follow-up. -Acute constipation with no acute findings on CT. Evaluated by GI. Recommended outpatient C scope/EGD.  -SMA stenosis on CTA evaluated by vascular surgery. Asymptomatic. No additional recommendations by vascular surgery. Will start aspirin. -HTN uncontrolled with tachycardia start metoprolol. Follow-up with PCP    On the day of discharge, pt felt better. No new complaints.     Pertinent Exam Findings on Day of Discharge:  General Appearance:    Alert, cooperative, no distress, appears stated age  Head:    Normocephalic, without obvious abnormality, atraumatic  Eyes:    PERRL, conjunctiva/corneas clear, EOM's intact  Lungs:    Clear to auscultation bilaterally, respirations unlabored   Heart:    Regular rate and rhythm, S1 and S2 normal, no murmur,   rub or gallop  Abdomen:     Soft, non-tender, bowel sounds active, no masses, no organomegaly  Extremities:   Extremities normal, atraumatic, no cyanosis or edema    Consults:  IP CONSULT TO CARDIOTHORACIC SURGERY  IP CONSULT TO HOSPITALIST  IP CONSULT TO HOSPITALIST  IP CONSULT TO GI    Patient Instructions:   [facility]   Home Medication Instructions DANA:[id number]    Printed on:03/18/21 1437   Medication Information                      aspirin EC 81 MG EC tablet  Take 1 tablet by mouth daily             metoprolol tartrate (LOPRESSOR) 25 MG tablet  Take 1 tablet by mouth 2 times daily             pantoprazole (PROTONIX) 40 MG tablet  Take 1 tablet by mouth daily (with breakfast)             polyethylene glycol (GLYCOLAX) 17 g packet  Take 17 g by mouth daily as needed for Constipation             tamsulosin (FLOMAX) 0.4 MG capsule  Take 1 capsule by mouth daily                   Diet:  DIET RENAL;    Activity:   activity as tolerated     Discharge Condition:   good    Disposition:   home    Follow-up    Everardo Rea  Go to  Medical Management  [address] [address]  [phone]   Janeth Mccann MD  Go to  OP EGD/C scope  [facility] [address]  [phone]   Ed Pham MD  Go to  OP cystoscopy  [address] [phone]       Time spent on discharge in the examination, evaluation, counseling and review of medications and discharge plan: 34 minutes       Discharge Physician Signed: Silvino Galeas

## 2021-03-18 NOTE — CONSULTS
Department of Cardiovascular & Thoracic Surgery   Consult Note    Reason for Consult:  SMA stenosis    Requesting Physician: Dr. Cecile Coppola    Date of Consult: 3/18/21      History Obtained From:  patient     HISTORY OF PRESENT ILLNESS:    The patient is a 69 y.o. male who presents with constipation and urinary retention. He states he had been having difficulty urinating for the past several days. He had not had a BM since Sunday and states his abdomen was distended and painful. He is feeling better now. He denies any fever, chills. There was incidental finding of SMA stenosis on CTA on admission. He denies pain after eating, weight loss, food fear. He states he has never had any abdominal pain until this Sunday when this current episode began. He is a nonsmoker. Past Medical History:        Diagnosis Date    Diabetes mellitus ([facility] [address]      Past Surgical History:    No past surgical history on file.   Current Medications:   Current Facility-Administered Medications: pantoprazole (PROTONIX) injection 40 mg, 40 mg, Intravenous, Daily  polyethylene glycol (GLYCOLAX) packet 17 g, 17 g, Oral, Daily  metoprolol tartrate (LOPRESSOR) tablet 25 mg, 25 mg, Oral, BID  sodium chloride flush 0.9 % injection 10 mL, 10 mL, Intravenous, 2 times per day  sodium chloride flush 0.9 % injection 10 mL, 10 mL, Intravenous, PRN  promethazine (PHENERGAN) tablet 12.5 mg, 12.5 mg, Oral, Q6H PRN **OR** ondansetron (ZOFRAN) injection 4 mg, 4 mg, Intravenous, Q6H PRN  polyethylene glycol (GLYCOLAX) packet 17 g, 17 g, Oral, Daily PRN  acetaminophen (TYLENOL) tablet 650 mg, 650 mg, Oral, Q6H PRN **OR** acetaminophen (TYLENOL) suppository 650 mg, 650 mg, Rectal, Q6H PRN  0.9 % sodium chloride infusion, , Intravenous, Continuous  heparin (porcine) injection 5,000 Units, 5,000 Units, Subcutaneous, 3 times per day  insulin lispro (HUMALOG) injection vial 0-6 Units, 0-6 Units, Subcutaneous, TID WC  insulin lispro (HUMALOG) injection vial 0-3 Units, leg pain   Respiratory: - cough, - shortness of breath, - wheezing   GI: - nausea, - vomiting, + abdominal pain, + constipation, - diarrhea   : - dysuria   MSK: - joint pain, - muscle pain  Integument: - rash, - skin color change   Heme: - easy bruising or bleeding  Neurologic: - headache, - weakness, - dizziness, - paresthesias       EXAM:  Constitutional: Blood pressure 125/73, pulse 78, temperature 97.9 °F (36.6 °C), temperature source Axillary, resp. rate 17, height 5' 6\" (1.676 m), weight 154 lb 5.2 oz (70 kg), SpO2 94 %. No apparent distress, appears stated age and cooperative. Neurologic: follows commands, no focal weakness noted   Lungs: Good respiratory effort. Clear to auscultation,   CV: Regular rate/ rhythm , no peripheral edema, feet warm and well perfused  GI: Soft, non-tender in all four quadrants, mildly distended, + bowel sounds, liver and spleen no palpable masses  : bladder nondistended   MSK: no obvious deformity   Skin: warm, pink and dry       DATA:  CTA   Impression   Nonocclusive plaque in the proximal aspect of the superior mesenteric artery   leads to moderate to high-grade stenosis.  Correlate with any clinical   evidence of chronic mesenteric ischemia.       Bilateral perinephric fat stranding and periureteral fat stranding. Correlate with clinical evidence of urinary tract infection.       Moderate to marked urinary bladder distention, likely on the basis of bladder   outlet obstruction. IMPRESSION  Patient Active Problem List   Diagnosis    Urinary retention       Urinary retention  SMA stenosis      RECOMMENDATIONS:  Pt is not and has not been symptomatic from SMA stenosis. There does appear to be good flow distal to the stenosis on CTA. Discussed symptoms to watch for. No surgical intervention indicated at this time.        Lise Bustamante PA-C

## 2021-03-18 NOTE — CARE COORDINATION
CM into see pt and family. Pt is ready for discharge. Pt has a PCP . Pt has insurance and able to afford his medications. Discharge plan is to return home and no needs.  [facility]

## 2021-03-18 NOTE — PLAN OF CARE
Problem: Activity:  Goal: Risk for activity intolerance will decrease  Description: Risk for activity intolerance will decrease  Outcome: Ongoing     Problem:  Bowel/Gastric:  Goal: Bowel function will improve  Description: Bowel function will improve  Outcome: Ongoing  Goal: Diagnostic test results will improve  Description: Diagnostic test results will improve  Outcome: Ongoing  Goal: Occurrences of nausea will decrease  Description: Occurrences of nausea will decrease  Outcome: Ongoing  Goal: Occurrences of vomiting will decrease  Description: Occurrences of vomiting will decrease  Outcome: Ongoing     Problem: Fluid Volume:  Goal: Maintenance of adequate hydration will improve  Description: Maintenance of adequate hydration will improve  Outcome: Ongoing     Problem: Health Behavior:  Goal: Ability to state signs and symptoms to report to health care provider will improve  Description: Ability to state signs and symptoms to report to health care provider will improve  Outcome: Ongoing     Problem: Physical Regulation:  Goal: Complications related to the disease process, condition or treatment will be avoided or minimized  Description: Complications related to the disease process, condition or treatment will be avoided or minimized  Outcome: Ongoing  Goal: Ability to maintain clinical measurements within normal limits will improve  Description: Ability to maintain clinical measurements within normal limits will improve  Outcome: Ongoing     Problem: Sensory:  Goal: Ability to identify factors that increase the pain will improve  Description: Ability to identify factors that increase the pain will improve  Outcome: Ongoing  Goal: Ability to notify healthcare provider of pain before it becomes unmanageable or unbearable will improve  Description: Ability to notify healthcare provider of pain before it becomes unmanageable or unbearable will improve  Outcome: Ongoing  Goal: Pain level will decrease  Description: Pain level will decrease  Outcome: Ongoing     Problem: Falls - Risk of:  Goal: Will remain free from falls  Description: Will remain free from falls  Outcome: Ongoing  Goal: Absence of physical injury  Description: Absence of physical injury  Outcome: Ongoing     Problem: Discharge Planning:  Goal: Discharged to appropriate level of care  Description: Discharged to appropriate level of care  Outcome: Ongoing     Problem: Serum Glucose Level - Abnormal:  Goal: Ability to maintain appropriate glucose levels will improve  Description: Ability to maintain appropriate glucose levels will improve  Outcome: Ongoing     Problem: Sensory Perception - Impaired:  Goal: Ability to maintain a stable neurologic state will improve  Description: Ability to maintain a stable neurologic state will improve  Outcome: Ongoing

## 2021-03-18 NOTE — PLAN OF CARE
Problem: Activity:  Goal: Risk for activity intolerance will decrease  Description: Risk for activity intolerance will decrease  3/18/2021 1212 by Zayda Craig RN  Outcome: Ongoing  3/18/2021 0156 by Margoth Hawkins RN  Outcome: Ongoing     Problem:  Bowel/Gastric:  Goal: Bowel function will improve  Description: Bowel function will improve  3/18/2021 1212 by Zayda Craig RN  Outcome: Ongoing  3/18/2021 0156 by Margoth Hawkins RN  Outcome: Ongoing  Goal: Diagnostic test results will improve  Description: Diagnostic test results will improve  3/18/2021 1212 by Zayda Craig RN  Outcome: Ongoing  3/18/2021 0156 by Margoth Hawkins RN  Outcome: Ongoing  Goal: Occurrences of nausea will decrease  Description: Occurrences of nausea will decrease  3/18/2021 1212 by Zayda Craig RN  Outcome: Ongoing  3/18/2021 0156 by Margoth Hawkins RN  Outcome: Ongoing  Goal: Occurrences of vomiting will decrease  Description: Occurrences of vomiting will decrease  3/18/2021 1212 by Zayda Craig RN  Outcome: Ongoing  3/18/2021 0156 by Margoth Hawkins RN  Outcome: Ongoing

## 2021-03-22 LAB
CULTURE: NORMAL
CULTURE: NORMAL
Lab: NORMAL
Lab: NORMAL
SPECIMEN: NORMAL
SPECIMEN: NORMAL

## 2021-03-23 LAB
EKG ATRIAL RATE: 117 BPM
EKG DIAGNOSIS: NORMAL
EKG P AXIS: 42 DEGREES
EKG P-R INTERVAL: 142 MS
EKG Q-T INTERVAL: 324 MS
EKG QRS DURATION: 74 MS
EKG QTC CALCULATION (BAZETT): 451 MS
EKG R AXIS: -42 DEGREES
EKG T AXIS: 33 DEGREES
EKG VENTRICULAR RATE: 117 BPM